# Patient Record
Sex: FEMALE | Race: WHITE
[De-identification: names, ages, dates, MRNs, and addresses within clinical notes are randomized per-mention and may not be internally consistent; named-entity substitution may affect disease eponyms.]

---

## 2017-02-09 NOTE — RADRPT
EXAM DATE/TIME:  02/09/2017 19:06 

 

[facility] COMPARISON:     

No previous studies available for comparison.

 

                     

INDICATIONS :     

Patient fell this afternoon. She stepped off curb and twisted her leg.

                     

 

MEDICAL HISTORY :     

None.          

 

SURGICAL HISTORY :     

None.   

 

ENCOUNTER:     

Initial                                        

 

ACUITY:     

1 day      

 

PAIN SCORE:     

10/10

 

LOCATION:     

Left  Foot.

 

FINDINGS:     

Three view examination of the left foot demonstrates no soft tissue swelling, dislocation, or fractur
e.   The tarsal bones appear intact.  The interphalangeal and metatarsophalangeal joints are intact. 
 The calcaneus is intact.  Bony mineralization is normal.

 

CONCLUSION:     

1. No acute findings.

 

 

 

 Garcia Coto MD on February 09, 2017 at 20:14           

Board Certified Radiologist.

 This report was verified electronically.

## 2017-02-09 NOTE — RADRPT
EXAM DATE/TIME:  02/09/2017 19:08 

 

[facility] COMPARISON:     

No previous studies available for comparison.

 

                     

INDICATIONS :     

Patient fell this afternoon. She stepped off curb and twisted her leg.

                     

 

MEDICAL HISTORY :     

None.          

 

SURGICAL HISTORY :     

None.   

 

ENCOUNTER:     

Initial                                        

 

ACUITY:     

1 day      

 

PAIN SCORE:     

10/10

 

LOCATION:     

Left  Ankle.

 

FINDINGS:     

Three view exam was performed of the left ankle.  The bony structures are in normal alignment.  No ev
idence of fracture, dislocation, or soft tissue swelling.  The ankle mortise is intact.  No radiopaqu
e foreign bodies are seen.  Bony mineralization is normal.

 

CONCLUSION:     

1. Soft tissue swelling over the lateral malleolus. No acute fracture.

 

 

 

 Garcia Coto MD on February 09, 2017 at 20:15           

Board Certified Radiologist.

 This report was verified electronically.

## 2017-11-01 ENCOUNTER — HOSPITAL ENCOUNTER (EMERGENCY)
Dept: HOSPITAL 17 - PHED | Age: 37
Discharge: HOME | End: 2017-11-01
Payer: SELF-PAY

## 2017-11-01 VITALS — BODY MASS INDEX: 32.37 KG/M2 | WEIGHT: 189.6 LBS | HEIGHT: 64 IN

## 2017-11-01 VITALS
HEART RATE: 84 BPM | RESPIRATION RATE: 16 BRPM | DIASTOLIC BLOOD PRESSURE: 86 MMHG | SYSTOLIC BLOOD PRESSURE: 131 MMHG | TEMPERATURE: 97.8 F | OXYGEN SATURATION: 98 %

## 2017-11-01 DIAGNOSIS — M54.6: Primary | ICD-10-CM

## 2017-11-01 DIAGNOSIS — F17.210: ICD-10-CM

## 2017-11-01 PROCEDURE — 99283 EMERGENCY DEPT VISIT LOW MDM: CPT

## 2017-11-01 NOTE — PD
HPI


.


Right mid back pain


Chief Complaint:  Musculoskeletal Complaint


Time Seen by Provider:  11:11


Travel History


International Travel<30 days:  No


Contact w/Intl Traveler<30days:  No


Traveled to known affect area:  No





History of Present Illness


HPI


37-year-old female patient presents to the emergency department for evaluation 

of right lateral mid back pain.  The pain is proximal to the scapula.  There is 

no midline spinal tenderness.  Patient denies any falls, traumas, injuries to 

this site.  Patient reports she woke up this morning with the pain.  Patient 

denies any fevers, chills, shortness breath, chest pain, abdominal pain, nausea

, vomiting, diarrhea.  Patient denies any incontinence of urine or stool.  

Patient denies any IV drug use.





PFSH


Past Medical History


Diabetes:  No


Diminished Hearing:  No


Immunizations Current:  Yes


:  2


Para:  1


:  1


Tubal Ligation:  Yes





Past Surgical History


 Section:  Yes


Gynecologic Surgery:  Yes (C SECTIONS)





Social History


Alcohol Use:  Yes (once weekly)


Tobacco Use:  Yes (2 cig per day)


Substance Use:  No





Allergies-Medications


(Allergen,Severity, Reaction):  


Coded Allergies:  


     penicillin G (Unverified  Allergy, Severe, UNKNOWN, 17)


Reported Meds & Prescriptions





Reported Meds & Active Scripts


Active


Ibuprofen 600 Mg Tab 600 Mg PO TID PRN








Review of Systems


Except as stated in HPI:  all other systems reviewed are Neg





Physical Exam


Narrative


GENERAL: Well-nourished, well-developed 37-year-old female patient in no acute 

distress.  Resting comfortably on the stretcher drinking a coffee.  Nontoxic 

appearing.


SKIN: Focused skin assessment warm/dry.  No ecchymosis, cyanosis or erythema.


HEAD: Normocephalic.  Atraumatic. 


NECK: Supple, trachea midline. No JVD or lymphadenopathy.


CARDIOVASCULAR: Regular rate and rhythm without murmurs, gallops, or rubs. 


RESPIRATORY: Breath sounds equal bilaterally. No accessory muscle use.


GASTROINTESTINAL: Abdomen soft, non-tender, nondistended. 


MUSCULOSKELETAL: Full range of motion in all extremities.  No cyanosis, or 

edema. 


BACK: Right lateral thoracic region tenderness to palpation.  No obvious 

deformity, ecchymosis, erythema or cyanosis.  No midline tenderness. No CVA 

tenderness.





Data


Data


Last Documented VS





Vital Signs








  Date Time  Temp Pulse Resp B/P (MAP) Pulse Ox O2 Delivery O2 Flow Rate FiO2


 


11/1/17 10:47 97.8 84 16 131/86 (101) 98   








Orders





 Orders


Orphenadrine  Sr (Norflex  Cr) (17 11:45)


Ibuprofen (Motrin) (17 11:45)


Ed Discharge Order (17 12:04)








[facility]


Medical Decision Making


Medical Screen Exam Complete:  Yes


Emergency Medical Condition:  Yes


Differential Diagnosis


Differential diagnoses include but not limited to muscular strain, muscular 

sprain, contusion


Narrative Course


37-year-old female presented to the emergency department for evaluation of 

right lateral thoracic region back pain.  Patient has no midline spinal 

tenderness.  There is no signs of obvious trauma such as ecchymosis, erythema, 

deformity.  Patient denies any falls, injuries or traumas to the area.  Patient 

states she woke up this morning with pain.  The pain is proximal to the right 

scapula.  Patient denies any paresthesias, incontinence of urine or stool, 

fevers or chills.  Patient denies any IV drug use.  Patient offered an IM 

injection of Norflex and Toradol.  Patient becomes hysterical saying she doesn'

t like needles.  Patient then offered by mouth muscle relaxer and NSAID in 

lieu.  Patient accepts the treatment plan.  Patient will be discharged home 

with instructions to use over-the-counter ibuprofen for pain management, 

heating pads and to follow-up with primary care.





Diagnosis





 Primary Impression:  


 Back pain


 Qualified Codes:  M54.6 - Pain in thoracic spine


Referrals:  


Primary Care Physician


Patient Instructions:  Back Pain (ED), General Instructions





***Additional Instructions:  


Please return to emergency department if your symptoms return or worsen. 


Follow up with your primary care provider. 


May use over-the-counter ibuprofen as needed for pain or swelling.


Disposition:  01 DISCHARGE HOME


Condition:  Stable











Lesia Hernandez PADDY 2017 12:03

## 2018-01-15 ENCOUNTER — HOSPITAL ENCOUNTER (EMERGENCY)
Dept: HOSPITAL 17 - PHEFT | Age: 38
Discharge: HOME | End: 2018-01-15
Payer: COMMERCIAL

## 2018-01-15 DIAGNOSIS — H01.001: Primary | ICD-10-CM

## 2018-01-15 DIAGNOSIS — Z72.0: ICD-10-CM

## 2018-01-15 PROCEDURE — 99283 EMERGENCY DEPT VISIT LOW MDM: CPT

## 2018-01-15 RX ADMIN — PROPARACAINE HYDROCHLORIDE 1 DROP: 5 SOLUTION/ DROPS OPHTHALMIC at 12:49

## 2025-01-03 NOTE — PD
HPI


Chief Complaint:  Injury


Time Seen by Provider:  18:52


Travel History


International Travel<30 days:  No


Contact w/Intl Traveler<30days:  No


Traveled to known affect area:  No





History of Present Illness


HPI


36-year-old female presents to the emergency department for evaluation of left 

ankle and foot injury that occurred today.  Patient states that she twisted her 

ankle and fell.  She denies any other injury.  No head injury or LOC.  No neck 

pain or back pain.  No chest pain or abdominal pain.  No vomiting.  Patient 

denies any chronic medical problems or taking any prescribed medications.





PFSH


Past Medical History


Diabetes:  No


Diminished Hearing:  No


Immunizations Current:  Yes


Pregnant?:  Not Pregnant


LMP:  2017


:  2


Para:  1


:  1





Past Surgical History


 Section:  Yes


Gynecologic Surgery:  Yes (C SECTIONS)


Hysterectomy:  No (TUBES TIED)





Social History


Alcohol Use:  Yes (1-2 times per month)


Tobacco Use:  No (quit 1 mo ago)


Substance Use:  No





Allergies-Medications


(Allergen,Severity, Reaction):  


Coded Allergies:  


     Penicillin (Verified  Allergy, Severe, UNKNOWN, 16)


Reported Meds & Prescriptions





Reported Meds & Active Scripts


Active


No Active Prescriptions or Reported Medications    








Review of Systems


Except as stated in HPI:  all other systems reviewed are Neg





Physical Exam


Narrative


GENERAL: Well-developed well-nourished female patient, afebrile.


SKIN: Warm and dry.


HEAD: Normocephalic.  Atraumatic.


EYES: No scleral icterus. No injection or drainage. 


NECK: Supple, trachea midline. No JVD or lymphadenopathy.


CARDIOVASCULAR: Regular rate and rhythm without murmurs, gallops, or rubs.  

Left pedal pulse is 2+.  Capillary refill is less than 2 seconds to the digits 

of the left foot.


RESPIRATORY: Breath sounds equal bilaterally. No accessory muscle use.  Lungs 

sounds clear to auscultation.


GASTROINTESTINAL: Abdomen soft, non-tender, nondistended. 


MUSCULOSKELETAL: No cyanosis, or edema.  Patient has tenderness over left medial

, lateral, anterior ankle and left dorsal foot.  Negative Amaro's test.


BACK: Nontender without obvious deformity. No CVA tenderness.





Data


Data


Last Documented VS





Vital Signs








  Date Time  Temp Pulse Resp B/P Pulse Ox O2 Delivery O2 Flow Rate FiO2


 


17 18:41 97.6 110 14 165/77 98 Room Air  








Orders





 Foot, Complete ([id number]) (17 )


Ankle, Complete ([id number]) (17 )


Acetamin-Hydrocod 325-5 Mg (Norco  5-325 (17 19:00)








MDM


Medical Decision Making


Medical Screen Exam Complete:  Yes


Emergency Medical Condition:  Yes


Medical Record Reviewed:  Yes


Differential Diagnosis


Sprain versus fracture versus contusion versus dislocation


Narrative Course


36-year-old female presents to the emergency department for evaluation of left 

ankle and foot injury.  X-ray left ankle and left foot are ordered and pending.





X-ray of the left ankle and left foot were read by my attending physician, Dr. Hartman.  No acute abnormality is seen on x-ray.  Patient is placed in a velcro 

ankle splint and given crutches.  She will be discharged with a prescription 

for Ibuprofen.  She is instructed to ice and elevate.  She is to follow up with 

a primary care physician or orthpedist if pain continues or worsens.





Diagnosis





 Primary Impression:  


 Left ankle sprain


 Qualified Code:  S93.402A - Sprain of left ankle, unspecified ligament, 

initial encounter


Referrals:  


Primary Care Physician


call for appointment


Patient Instructions:  Ankle Sprain (ED), General Instructions





***Additional Instructions:


Elevate.


Ice for 20 mins 4-5 times daily.


Take Ibuprofen as directed as needed with food for pain.


Wear splint and use crutches as needed.


Follow up with a primary care physician.


Return to the emergency department for any acute, worsening of symptoms.


***Med/Other Pt SpecificInfo:  Prescription(s) given


Scripts


Ibuprofen 600 Mg [id number] Mg PO TID PRN (PAIN SCALE 1 TO 10) #21 TAB  Ref 0


   Prov:Ameena Lozano         17


Disposition:  01 DISCHARGE HOME


Condition:  Stable








Ameena Lozano 2017 18:54 (3) Alterations in Oxygenation (Respiratory Diagnosis, Dehydration, Anemia, Anorexia, Syncope/Dizziness, etc.)